# Patient Record
Sex: FEMALE | Race: WHITE | Employment: PART TIME | URBAN - METROPOLITAN AREA
[De-identification: names, ages, dates, MRNs, and addresses within clinical notes are randomized per-mention and may not be internally consistent; named-entity substitution may affect disease eponyms.]

---

## 2018-02-12 PROBLEM — E78.00 HYPERCHOLESTEROLEMIA: Status: ACTIVE | Noted: 2018-02-12

## 2018-02-12 PROBLEM — I25.10 CORONARY ARTERY DISEASE INVOLVING NATIVE CORONARY ARTERY OF NATIVE HEART WITHOUT ANGINA PECTORIS: Status: ACTIVE | Noted: 2018-02-12

## 2019-10-17 ENCOUNTER — HOSPITAL ENCOUNTER (OUTPATIENT)
Dept: ULTRASOUND IMAGING | Age: 64
Discharge: HOME | End: 2019-10-17
Attending: DERMATOLOGY
Payer: COMMERCIAL

## 2019-10-17 DIAGNOSIS — D48.9 NEOPLASM OF UNCERTAIN BEHAVIOR: ICD-10-CM

## 2019-10-17 PROCEDURE — 76882 US LMTD JT/FCL EVL NVASC XTR: CPT

## 2020-10-22 DIAGNOSIS — D48.9 NEOPLASM OF UNCERTAIN BEHAVIOR: ICD-10-CM

## 2021-03-12 ENCOUNTER — HOSPITAL ENCOUNTER (OUTPATIENT)
Dept: BONE DENSITY | Age: 66
Discharge: HOME | End: 2021-03-12
Attending: INTERNAL MEDICINE
Payer: COMMERCIAL

## 2021-03-12 DIAGNOSIS — Z00.00 ROUTINE GENERAL MEDICAL EXAMINATION AT A HEALTH CARE FACILITY: ICD-10-CM

## 2021-03-12 PROCEDURE — 77080 DXA BONE DENSITY AXIAL: CPT

## 2021-03-21 PROBLEM — M85.80 OSTEOPENIA: Status: ACTIVE | Noted: 2021-03-12

## 2021-03-21 NOTE — PROGRESS NOTES
Called results. Advised  Vit D3 1000 units daily and Calcium Carbonate 500 mg to 600 mg bid. Continue weight bearing exercises. . F/u DEXA in 2 years .  F/u sooner prn

## 2022-03-18 PROBLEM — M85.80 OSTEOPENIA: Status: ACTIVE | Noted: 2021-03-12

## 2022-03-19 PROBLEM — E78.00 HYPERCHOLESTEROLEMIA: Status: ACTIVE | Noted: 2018-02-12

## 2022-03-19 PROBLEM — I25.10 CORONARY ARTERY DISEASE INVOLVING NATIVE CORONARY ARTERY OF NATIVE HEART WITHOUT ANGINA PECTORIS: Status: ACTIVE | Noted: 2018-02-12

## 2022-08-04 NOTE — PROGRESS NOTES
HPI: Cindi Crabtree (: 1955) is a 61 y.o. female, patient, here for evaluation of the following chief complaint(s):    Hand Pain (Bilateral hands pain especially at joints. Right hand dominant .)  Patient is seen today to evaluate her hands. She has complained of right more than left hand pain. She clinically demonstrates bilateral thumb CMC basal joint arthritis in addition to osteoarthritis in her hand with morning stiffness. She localizes clicking and locking involving her right ring and middle more than her left hand middle finger. She denies any falls or significant cyst formation. Vitals:  Ht 5' 8\" (1.727 m)   Wt 165 lb (74.8 kg)   BMI 25.09 kg/m²    Body mass index is 25.09 kg/m². Allergies   Allergen Reactions    Codeine Hives       Current Outpatient Medications   Medication Sig    Pennsaid 2 % sopk 2 Pump(s) by Apply Externally route two (2) times daily as needed for Pain. methylPREDNISolone (MEDROL DOSEPACK) 4 mg tablet Per dose pack instructions    cholecalciferol (Vitamin D3) 25 mcg (1,000 unit) cap Take  by mouth daily. calcium carb, citrate/vit D3 (CITRACAL-D3 SLOW RELEASE PO) Take  by mouth. acetaminophen/diphenhydramine (TYLENOL PM PO) Take  by mouth nightly. atorvastatin (LIPITOR) 40 mg tablet Take 1 Tab by mouth nightly. metoprolol succinate (TOPROL-XL) 25 mg XL tablet Take 1 Tab by mouth daily. aspirin delayed-release 81 mg tablet Take 81 mg by mouth daily. OTHER Vit D daily ; Melatonin or Tylenol PM qhs ; Citrocal daily (Patient not taking: Reported on 2022)    nitroglycerin (NITROSTAT) 0.4 mg SL tablet 0.4 mg by SubLINGual route every five (5) minutes as needed for Chest Pain. No current facility-administered medications for this visit.        Past Medical History:   Diagnosis Date    CAD (coronary artery disease) 11/9/15    PCI    Coronary artery disease involving native coronary artery of native heart without angina pectoris 2/12/2018    Hypercholesterolemia 2/12/2018    Hypothyroidism 9/5/2013    Osteopenia 03/12/2021    DEXA     Trauma 1970's     rt thumb fx     Trigger finger     b/l #3 finger         Past Surgical History:   Procedure Laterality Date    HX COLONOSCOPY  1977    negative. HX COLONOSCOPY  04/23/2017    Diverticuli. f/u 8 y Dr. Garrick Gray HEENT      hemithyroidectomy for benign disease    [facility] CARDIAC SURG PROCEDURE UNLIST  Nov 2015     LCX stent. Family History   Problem Relation Age of Onset    Heart Disease Father         pacemaker    High Cholesterol Mother     Cancer Brother         Lymphoma         Social History     Tobacco Use    Smoking status: Never    Smokeless tobacco: Never   Substance Use Topics    Alcohol use: Yes     Comment: 1 drink to 2 drinks per week     Drug use: No        Review of Systems   All other systems reviewed and are negative. ROS    Positive for: Musculoskeletal  Last edited by Becky Come on 8/8/2022  3:09 PM.             Physical Exam    Both hands have overall good range of motion but she has morning stiffness that she describes and has some difficulty making full tight fist due to osteoarthritis. She has good sensation refill. She has pain and crepitation produced with grind testing of each thumb carpometacarpal joint and has tenderness of the right more than left A1 pulley region of the ring and middle finger. Imaging:    XR Results (most recent):  Results from Appointment encounter on 08/08/22    XR HAND BILAT AP LAT OBL (MIN 3 V)    Narrative  AP, lateral and oblique x-ray of both hands were obtained that show advanced localized osteoarthritis of each thumb carpometacarpal joint with complete collapse, sclerosis and osteophyte formation including some subluxation. There is narrowing especially with arthritic findings of the DIP more than PIP joints especially index and middle fingers. No fractures.         ASSESSMENT/PLAN:  Below is the assessment and plan developed based on review of pertinent history, physical exam, labs, studies, and medications. The patient's examination was consistent with bilateral hand especially thumb CMC joint osteoarthritis in addition to bilateral middle and right ring trigger finger. She was offered but deferred injection therapy. She would prefer to consider a steroid Dosepak. and a topical oral anti-inflammatory she may work with hand motion and strength to her tolerance with stretching as needed. Future consideration may need to be given for thumb CMC injections or basal joint arthroplasties which she was considering. She will also try to make ergonomic improvements on her workstation. Follow-up in 4 to 6 weeks sooner if needed. 1. Right hand pain  -     XR HAND BILAT AP LAT OBL (MIN 3 V); Future  2. Left hand pain  -     XR HAND BILAT AP LAT OBL (MIN 3 V); Future  3. Primary osteoarthritis of both first carpometacarpal joints  -     Pennsaid 2 % sopk; 2 Pump(s) by Apply Externally route two (2) times daily as needed for Pain., Normal, Disp-1 Packet, R-1, CHEN  -     methylPREDNISolone (MEDROL DOSEPACK) 4 mg tablet; Per dose pack instructions, Normal, Disp-1 Dose Pack, R-0  4. Trigger ring finger of right hand  5. Trigger finger, right middle finger  6. Trigger finger, left middle finger  7. Primary osteoarthritis of both hands      No follow-ups on file. An electronic signature was used to authenticate this note.   -- Easton Maxwell MD

## 2022-08-08 ENCOUNTER — OFFICE VISIT (OUTPATIENT)
Dept: ORTHOPEDIC SURGERY | Age: 67
End: 2022-08-08
Payer: COMMERCIAL

## 2022-08-08 VITALS — WEIGHT: 165 LBS | BODY MASS INDEX: 25.01 KG/M2 | HEIGHT: 68 IN

## 2022-08-08 DIAGNOSIS — M19.041 PRIMARY OSTEOARTHRITIS OF BOTH HANDS: ICD-10-CM

## 2022-08-08 DIAGNOSIS — M65.341 TRIGGER RING FINGER OF RIGHT HAND: ICD-10-CM

## 2022-08-08 DIAGNOSIS — M18.0 PRIMARY OSTEOARTHRITIS OF BOTH FIRST CARPOMETACARPAL JOINTS: ICD-10-CM

## 2022-08-08 DIAGNOSIS — M79.642 LEFT HAND PAIN: ICD-10-CM

## 2022-08-08 DIAGNOSIS — M65.332 TRIGGER FINGER, LEFT MIDDLE FINGER: ICD-10-CM

## 2022-08-08 DIAGNOSIS — M65.331 TRIGGER FINGER, RIGHT MIDDLE FINGER: ICD-10-CM

## 2022-08-08 DIAGNOSIS — M19.042 PRIMARY OSTEOARTHRITIS OF BOTH HANDS: ICD-10-CM

## 2022-08-08 DIAGNOSIS — M79.641 RIGHT HAND PAIN: Primary | ICD-10-CM

## 2022-08-08 PROCEDURE — 99203 OFFICE O/P NEW LOW 30 MIN: CPT | Performed by: ORTHOPAEDIC SURGERY

## 2022-08-08 RX ORDER — METHYLPREDNISOLONE 4 MG/1
TABLET ORAL
Qty: 1 DOSE PACK | Refills: 0 | Status: SHIPPED | OUTPATIENT
Start: 2022-08-08

## 2022-08-08 RX ORDER — DICLOFENAC SODIUM 20 MG/G
2 SOLUTION TOPICAL
Qty: 1 PACKET | Refills: 1 | Status: SHIPPED | OUTPATIENT
Start: 2022-08-08

## 2022-08-08 RX ORDER — GLUCOSAMINE SULFATE 1500 MG
POWDER IN PACKET (EA) ORAL DAILY
COMMUNITY

## 2022-08-08 NOTE — LETTER
8/8/2022    Patient: Macario Vasquez   YOB: 1955   Date of Visit: 8/8/2022     [address] [facility], [address] [place] [address] Leonel Isidoro    Dear [address] [place], [place],      Thank you for referring Ms. Earnestine Nichols to [facility] for evaluation. My notes for this consultation are attached. If you have questions, please do not hesitate to call me. I look forward to following your patient along with you.       Sincerely,    Lino Prasad MD

## 2022-11-30 ENCOUNTER — TRANSCRIBE ORDER (OUTPATIENT)
Dept: GENERAL RADIOLOGY | Age: 67
End: 2022-11-30

## 2022-11-30 ENCOUNTER — HOSPITAL ENCOUNTER (OUTPATIENT)
Dept: GENERAL RADIOLOGY | Age: 67
Discharge: HOME | End: 2022-11-30
Attending: CHIROPRACTOR
Payer: COMMERCIAL

## 2022-11-30 DIAGNOSIS — M99.03 SOMATIC DYSFUNCTION OF LUMBAR REGION: ICD-10-CM

## 2022-11-30 DIAGNOSIS — M99.03 SOMATIC DYSFUNCTION OF LUMBAR REGION: Primary | ICD-10-CM

## 2022-11-30 PROCEDURE — 72100 X-RAY EXAM L-S SPINE 2/3 VWS: CPT
